# Patient Record
Sex: FEMALE | Race: WHITE
[De-identification: names, ages, dates, MRNs, and addresses within clinical notes are randomized per-mention and may not be internally consistent; named-entity substitution may affect disease eponyms.]

---

## 2017-12-28 NOTE — DIAGNOSTIC IMAGING REPORT
Bilateral screening mammogram 2D views with tomosynthesis



The current study was also evaluated with a Computer Aided

Detection (CAD) system.



Indication: Screening. No current complaints stated on the

questionnaire.



COMPARISON: 12/06/2016.



FINDINGS:

The breasts are composed of scattered fibroglandular densities.

There is a 5 mm nodule seen in the central aspect of the right

breast which appears to be present on the previous study and is

associated with circumscribed margins likely related to an

intramammary lymph node or cyst. Punctate calcifications are seen

in the breasts. Allowing for technique and positional

differences, no suspicious change is seen.



IMPRESSION: No significant change.



ACR BI-RADS Category 2: Benign findings.

Result letter will be mailed to the patient.

Note: At least 10% of breast cancer is not imaged by mammography.



 



Dictated by: 



  Dictated on workstation # [id number]

## 2019-01-15 NOTE — DIAGNOSTIC IMAGING REPORT
INDICATION: 

Routine screening.



COMPARISON:     

12/27/2017 and 12/06/2016.



TECHNIQUE:  

2D and 3D bilateral screening mammography was performed with CAD.



FINDINGS:

Both breasts are heterogeneously dense, limiting the sensitivity

of mammography. The fibronodular parenchymal pattern appears to

be fairly stable. No dominant mass or malignant appearing

microcalcifications are seen. The axillae are unremarkable.



IMPRESSION: 

No mammographic features suspicious for malignancy are

identified.



ACR BI-RADS Category 2: Benign findings.

Result letter will be mailed to the patient.

Note: At least 10% of breast cancer is not imaged by mammography.



Dictated by: 



  Dictated on workstation # [id number]

## 2020-02-26 NOTE — DIAGNOSTIC IMAGING REPORT
INDICATION: Routine screening.



COMPARISON: Comparison is made with prior mammogram of 01/14/2019

and 12/27/2017.



2D and 3D bilateral screening mammography was performed.



The current study was also evaluated with a Computer Aided

Detection (CAD) system. 3-D tomosynthesis was also performed and

reviewed.



FINDINGS: Both breasts are heterogeneously dense, limiting the

sensitivity of mammography. No mass or malignant-appearing

microcalcifications are seen. Axillae are unremarkable.



IMPRESSION: No mammographic features suspicious for malignancy

are identified.



ACR BI-RADS Category 1: Negative.

Result letter will be mailed to the patient.

Note:  At least 10% of breast cancer is not imaged by

mammography.



Dictated by: 



  Dictated on workstation # [id number]

## 2021-03-30 ENCOUNTER — HOSPITAL ENCOUNTER (OUTPATIENT)
Dept: HOSPITAL 75 - RAD | Age: 61
End: 2021-03-30
Attending: NURSE PRACTITIONER
Payer: COMMERCIAL

## 2021-03-30 DIAGNOSIS — Z12.31: Primary | ICD-10-CM

## 2021-03-30 PROCEDURE — 77067 SCR MAMMO BI INCL CAD: CPT

## 2021-03-30 PROCEDURE — 77063 BREAST TOMOSYNTHESIS BI: CPT

## 2021-03-31 NOTE — DIAGNOSTIC IMAGING REPORT
EXAMINATION: Digital mammogram



INDICATION: Bilateral screening



This study was compared to the prior exam of 02/26/2020,

1/14/2019 and 12/27/2017. 



At this time there are no current complaints.



The current study was also evaluated with a Computer Aided

Detection (CAD) system.



FINDINGS: The fibroglandular tissue in both breasts is

heterogeneously dense. This does limit the sensitivity of this

exam. Overall, there does not appear to have been any significant

change when compared to the prior study. No primary or secondary

sign of malignancy is noted.



IMPRESSION: There is no radiographic evidence for malignancy.



ACR category 1 



ACR BI-RADS Category 1: Negative.

Result letter will be mailed to the patient.

Note:  At least 10% of breast cancer is not imaged by

mammography.





  Dictated on workstation # [id number]

## 2022-04-04 ENCOUNTER — HOSPITAL ENCOUNTER (OUTPATIENT)
Dept: HOSPITAL 75 - RAD | Age: 62
End: 2022-04-04
Attending: NURSE PRACTITIONER
Payer: COMMERCIAL

## 2022-04-04 DIAGNOSIS — Z12.31: Primary | ICD-10-CM

## 2022-04-04 PROCEDURE — 77067 SCR MAMMO BI INCL CAD: CPT

## 2022-04-04 PROCEDURE — 77063 BREAST TOMOSYNTHESIS BI: CPT

## 2022-04-05 NOTE — DIAGNOSTIC IMAGING REPORT
INDICATION: 

Routine screening.



COMPARISON:     

03/30/2021 and 02/26/2020.



TECHNIQUE: 

2D and 3D bilateral screening mammography was performed with CAD.



FINDINGS:

Both breasts are heterogeneously dense, limiting the sensitivity

of mammography. The parenchymal pattern is stable. No mass or

malignant-appearing microcalcifications are seen. The axillae are

unremarkable.



IMPRESSION: 

No mammographic features suspicious for malignancy are

identified.



ACR BI-RADS Category 1: Negative.

Result letter will be mailed to the patient.

Note:  At least 10% of breast cancer is not imaged by

mammography.



Dictated by: 



  Dictated on workstation # [id number]

## 2023-04-10 ENCOUNTER — HOSPITAL ENCOUNTER (OUTPATIENT)
Dept: HOSPITAL 75 - RAD | Age: 63
End: 2023-04-10
Attending: NURSE PRACTITIONER
Payer: COMMERCIAL

## 2023-04-10 DIAGNOSIS — Z12.31: Primary | ICD-10-CM

## 2023-04-10 PROCEDURE — 77067 SCR MAMMO BI INCL CAD: CPT

## 2023-04-10 PROCEDURE — 77063 BREAST TOMOSYNTHESIS BI: CPT

## 2023-04-10 NOTE — DIAGNOSTIC IMAGING REPORT
TECHNIQUE: 3 bilateral screening mammogram The current study was

also evaluated with a Computer Aided Detection (CAD) system.



COMPARISON: 04/04/2022,  3/30/2021 and 2/26/2020.



FINDINGS:



At this time, there are no current complaints.



There are scattered fibroglandular densities in both breasts

which could obscure a lesion. Overall, there does not appear to

have been any significant change when compared to the prior exam.

No primary or secondary sign of malignancy is noted.



IMPRESSION: There is no radiographic evidence for malignancy.



BI-RADS CATEGORY 1    NEGATIVE



ACR BI-RADS Category 1: Negative.

Result letter will be mailed to the patient.

Note:  At least 10% of breast cancer is not imaged by

mammography.









Dictated by: 



  Dictated on workstation # [id number]